# Patient Record
Sex: MALE | URBAN - METROPOLITAN AREA
[De-identification: names, ages, dates, MRNs, and addresses within clinical notes are randomized per-mention and may not be internally consistent; named-entity substitution may affect disease eponyms.]

---

## 2017-01-23 ENCOUNTER — OFFICE VISIT - HEALTHEAST (OUTPATIENT)
Dept: FAMILY MEDICINE | Facility: CLINIC | Age: 47
End: 2017-01-23

## 2017-01-23 DIAGNOSIS — M25.531 RIGHT WRIST PAIN: ICD-10-CM

## 2017-01-23 ASSESSMENT — MIFFLIN-ST. JEOR: SCORE: 1512.63

## 2017-01-24 ENCOUNTER — OFFICE VISIT (OUTPATIENT)
Dept: FAMILY MEDICINE | Facility: CLINIC | Age: 47
End: 2017-01-24

## 2017-01-24 VITALS
HEART RATE: 93 BPM | SYSTOLIC BLOOD PRESSURE: 133 MMHG | HEIGHT: 61 IN | WEIGHT: 175 LBS | TEMPERATURE: 98.1 F | DIASTOLIC BLOOD PRESSURE: 80 MMHG | BODY MASS INDEX: 33.04 KG/M2

## 2017-01-24 DIAGNOSIS — T14.8XXA BRUISE OF MUSCLE: Primary | ICD-10-CM

## 2017-01-24 RX ORDER — IBUPROFEN 600 MG/1
600 TABLET, FILM COATED ORAL EVERY 6 HOURS PRN
Qty: 90 TABLET | Refills: 1 | Status: SHIPPED | OUTPATIENT
Start: 2017-01-24

## 2017-01-24 NOTE — MR AVS SNAPSHOT
After Visit Summary   1/24/2017    Blaine Young    MRN: [id number]           Patient Information     Date Of Birth          1970        Visit Information        Provider Department      1/24/2017 4:10 PM Isabella Campbell MD [facility]        Today's Diagnoses     Bruise of muscle    -  1        Follow-ups after your visit        Additional Services     PHYSICAL THERAPY REFERRAL       PT/OT REFERRAL  Blaine Young  1970  Phone #: [phone] (home)    needed: No  Language: English    PT/OT  Facility:   Other: Patient's preference     History: likely hematoma on right quad and muscle bruise     Precautions/Contraindications: None    Imaging Studies: None    Surgical Procedure/Test Results: None    Treatment Goals:   Increase: Flexibility and Strength  Decrease: Edema, Pain and Spasm    Prognosis: excellent    Visits: Up to 10    Evaluate: Evaluate and treat    Plan: Manual Therapy    Use of Ionto/Phonophoresis Approved - patient requires a separate medication prescription. Dexamethasone 4 mg/ml injectable - 10cc                  Future tests that were ordered for you today     Open Future Orders        Priority Expected Expires Ordered    PHYSICAL THERAPY REFERRAL Routine  1/24/2018 1/24/2017            Who to contact     Please call your clinic at [phone] to:    Ask questions about your health    Make or cancel appointments    Discuss your medicines    Learn about your test results    Speak to your doctor   If you have compliments or concerns about an experience at your clinic, or if you wish to file a complaint, please contact [facility] Patient Relations at [phone] or email us at [email or web address]         Additional Information About Your Visit        MyChart Information     Forticom is an electronic gateway that provides easy, online access to your medical records. With Forticom, you can request a clinic appointment, read  "your test results, renew a prescription or communicate with your care team.     To sign up for Hy[employer] Dxhart visit the website at [email or web address]   You will be asked to enter the access code listed below, as well as some personal information. Please follow the directions to create your username and password.     Your access code is: [email or web address]  Expires: 2017  4:31 PM     Your access code will  in 90 days. If you need help or a new code, please contact your [facility] or call [phone] for assistance.        Care EveryWhere ID     This is your Care EveryWhere ID. This could be used by other organizations to access your [place] medical records  [id number]        Your Vitals Were     Pulse Temperature Height BMI (Body Mass Index)          93 98.1  F (36.7  C) (Oral) 5' 1.42\" (156 cm) 32.62 kg/m2         Blood Pressure from Last 3 Encounters:   17 133/80    Weight from Last 3 Encounters:   17 175 lb (79.379 kg)                 Today's Medication Changes          These changes are accurate as of: 17  4:31 PM.  If you have any questions, ask your nurse or doctor.               Start taking these medicines.        Dose/Directions    ibuprofen 600 MG tablet   Commonly known as:  ADVIL/MOTRIN   Used for:  Bruise of muscle   Started by:  Isabella Campbell MD        Dose:  600 mg   Take 1 tablet (600 mg) by mouth every 6 hours as needed for moderate pain   Quantity:  90 tablet   Refills:  1            Where to get your medicines      These medications were sent to [facility] [address] [place] [address] -  [facility]   [place] [facility] [address]     Phone:  [phone]    - ibuprofen 600 MG tablet             Primary Care Provider Office Phone # Fax #    Isabella Campbell [phone]       [address] [address]        Thank you!     Thank you for choosing [facility]  for your care. Our goal " is always to provide you with excellent care. Hearing back from our patients is one way we can continue to improve our services. Please take a few minutes to complete the written survey that you may receive in the mail after your visit with us. Thank you!             Your Updated Medication List - Protect others around you: Learn how to safely use, store and throw away your medicines at www.disposemymeds.org.          This list is accurate as of: 1/24/17  4:31 PM.  Always use your most recent med list.                   Brand Name Dispense Instructions for use    ibuprofen 600 MG tablet    ADVIL/MOTRIN    90 tablet    Take 1 tablet (600 mg) by mouth every 6 hours as needed for moderate pain

## 2017-01-24 NOTE — PROGRESS NOTES
Preceptor attestation:  Patient seen and discussed with the resident.  Assessment and plan reviewed with resident and agreed upon.  Supervising physician: Luther Massey  [facility]

## 2017-01-24 NOTE — PROGRESS NOTES
"       SUBJECTIVE       Blaine Young is a 46 year old  male with a PMH significant for:   There is no problem list on file for this patient.    Per nursing;  Pt states he was hurt at work.  He states he was working on a car and tried to do something with a bar and when he pulled it hit him in the R thigh really hard.  He states this happened last week and he had a lump on his thigh and it was hard to walk on his leg.     He notes that the area hurts a lot when he engages the muscles in his thigh.  He does not have any swelling. He is having severe pain when he touches the area, pain locally over the bruise.  He also is having pain in his hip when he engages the hip.  He has tried ibuprofen 600mg twice daily, heat helping.  He can walk okay, but feels pain when engaging the muscles.  Pain is over his right thigh to his hip proximally.      PMH, Medications and Allergies were reviewed and updated as needed.        REVIEW OF SYSTEMS     Pertinent review, per HPI.        OBJECTIVE     Filed Vitals:    01/24/17 1615   BP: 133/80   Pulse: 93   Temp: 98.1  F (36.7  C)   TempSrc: Oral   Height: 5' 1.42\" (156 cm)   Weight: 175 lb (79.379 kg)     Body mass index is 32.62 kg/(m^2).    Constitutional: Well appearing, no acute distress.  Extremities: No swelling, erythema over right thigh.  Tenderness locally over anterior thigh to palpation.  Able to move extremity spontaneously, strength testing hard to tell based on pain of thigh.  Skin graft present over right thigh where pain is, but no ecchymosis or erythema noted.     No results found for this or any previous visit (from the past 24 hour(s)).        ASSESSMENT AND PLAN     Muscle injury; possible hematoma.    Comment: Patient with injury to his right thigh at work, when he had a kin come down on it very hard.  Now having pain over the area. Happened one week ago.  Ibuprofen and heat help - continue these and increase iburprofen to 600mg TID. Will send to PT for manual " therapy.  No concern for bony injury and will have patient follow up in clinic in 2 weeks for another exam or sooner if worsening symptoms   Plan: PHYSICAL THERAPY REFERRAL, ibuprofen         (ADVIL/MOTRIN) 600 MG tablet    RTC 2 wk or sooner if develops new or worsening symptoms.      Isabella Campbell MD  PGY-3 [facility]

## 2017-01-26 NOTE — PATIENT INSTRUCTIONS
[facility]  Physical Therapy  [phone]  Referral has been faxed they will contact pt to schedule Michelle Benedict 9:15 AM 1/26/2017

## 2017-01-27 ENCOUNTER — AMBULATORY - HEALTHEAST (OUTPATIENT)
Dept: ADMINISTRATIVE | Facility: REHABILITATION | Age: 47
End: 2017-01-27

## 2017-01-27 DIAGNOSIS — T14.8XXA BRUISE OF MUSCLE: ICD-10-CM

## 2017-03-03 ENCOUNTER — THERAPY VISIT (OUTPATIENT)
Dept: PHYSICAL THERAPY | Facility: CLINIC | Age: 47
End: 2017-03-03
Payer: OTHER MISCELLANEOUS

## 2017-03-03 DIAGNOSIS — M54.6 BILATERAL THORACIC BACK PAIN: ICD-10-CM

## 2017-03-03 DIAGNOSIS — M54.50 BILATERAL LOW BACK PAIN WITHOUT SCIATICA: Primary | ICD-10-CM

## 2017-03-03 PROCEDURE — 97140 MANUAL THERAPY 1/> REGIONS: CPT | Mod: GP | Performed by: PHYSICAL THERAPIST

## 2017-03-03 PROCEDURE — 97014 ELECTRIC STIMULATION THERAPY: CPT | Mod: GP | Performed by: PHYSICAL THERAPIST

## 2017-03-03 PROCEDURE — 97161 PT EVAL LOW COMPLEX 20 MIN: CPT | Mod: GP | Performed by: PHYSICAL THERAPIST

## 2017-03-03 NOTE — PROGRESS NOTES
Physical Therapy Initial Evaluation   3/6/17   Precautions/Restrictions/MD instructions:    Therapist Impression:   Pt is a 47 y/o male. Pt presents with LBP and thoracic pain, decrease ROM and strength deficits. These impairments limit their ability to lift objects and perform work duties Skilled PT services necessary in order to reduce impairments and improve independent function.    Subjective:   Chief Complaint: Lumbar pain after carrying heavy objects at work   DOI/onset: 2/21/17DOS:   Location: Low back  Quality: sharp   Frequency: constant  Radiates: radiates thoracic pain and down L buttock  Pain scale: 5/10  Time of day: no 24 hr pain pattern Sleeping: wakes patient from sleep    Exacerbated by: lifting objects  Relieved by: rest Progression: improving   Previous Treatment: n/a Effect of prior treatment: n/a   PMH and/or surgical history:    Imaging:     Occupation:  Job duties:    Current HEP/exercise regimen:  Patient's goals:   Medications:   General health as reported by patient:   Return to MD:   Pt reports no red flags   Lumbar Spine/SIJ Evaluation:    Gait: Slight bent posture while ambulating, decrease alma       Lumbar AROM:   Motion ROM Pain   Flexion 15% loss +   Extension WFL -   Side Bend L WFL +   Side Bend R WFL -     Repeated Motion: No pain with repeated standing extension    Lumbar Myotomes   L R   T12-L3 (Hip Flexion) 4/5 5/5   L2-L4 (Knee Extension) 4/5 5/5   L4 (Ankle DF) 5/5 5/5   L5 (Great Toe Ext) 5/5 5/5   S1 (Ankle PF) 5/5 5/5     Lumbar DTR's:    L R   L4 (Quad) NT NT   S1 (Achilles) NT NT     Lumbar Dermatomes: NT as patient reports no changes in        Palpation: TTP along L3-L5 with PAs, TTP along B low back extensors, TTP along thoracic extensors bilaterally from T-10 to T5    Subjective:    HPI                    Objective:    System    Physical Exam    General     ROS    Assessment/Plan:      Patient is a 46 year old male with thoracic and lumbar complaints.    Patient  has the following significant findings with corresponding treatment plan.                Diagnosis 1:  LBP and thoracic pain  Pain -  hot/cold therapy, US, electric stimulation, manual therapy, splint/taping/bracing/orthotics, self management, education, directional preference exercise and home program  Decreased ROM/flexibility - manual therapy and therapeutic exercise  Decreased joint mobility - manual therapy and therapeutic exercise  Decreased strength - therapeutic exercise and therapeutic activities  Impaired gait - gait training  Impaired muscle performance - neuro re-education  Decreased function - therapeutic activities    Therapy Evaluation Codes:   1) History comprised of:   Personal factors that impact the plan of care:      None.    Comorbidity factors that impact the plan of care are:      Diabetes.     Medications impacting care: Muscle Relaxants.  2) Examination of Body Systems comprised of:   Body structures and functions that impact the plan of care:      Lumbar spine and Thoracic Spine.   Activity limitations that impact the plan of care are:      Lifting, Walking and Working.  3) Clinical presentation characteristics are:   Stable/Uncomplicated.  4) Decision-Making    Low complexity using standardized patient assessment instrument and/or measureable assessment of functional outcome.  Cumulative Therapy Evaluation is: Low complexity.    Previous and current functional limitations:  (See Goal Flow Sheet for this information)    Short term and Long term goals: (See Goal Flow Sheet for this information)     Communication ability:  Patient appears to be able to clearly communicate and understand verbal and written communication and follow directions correctly.  Treatment Explanation - The following has been discussed with the patient:   RX ordered/plan of care  Anticipated outcomes  Possible risks and side effects  This patient would benefit from PT intervention to resume normal activities.   Rehab  potential is good.    Frequency:  1 X week, once daily  Duration:  for 6 weeks  Discharge Plan:  Achieve all LTG.  Independent in home treatment program.  Reach maximal therapeutic benefit.    Please refer to the daily flowsheet for treatment today, total treatment time and time spent performing 1:1 timed codes.

## 2017-03-03 NOTE — LETTER
[facility] PHYSICAL THERAPY  [address] [place] [address]  [phone]    2017    Re: Blaine Young   :   1970  MRN:  [id number]   REFERRING PHYSICIAN:   Narayan Hill    [facility] PHYSICAL [facility]    Date of Initial Evaluation:  3/3/2017  Visits:  Rxs Used: 1  Reason for Referral:     Bilateral low back pain without sciatica  Bilateral thoracic back pain    EVALUATION SUMMARY    Physical Therapy Initial Evaluation     Therapist Impression:   Pt is a 45 y/o male. Pt presents with LBP and thoracic pain, decrease ROM and strength deficits. These impairments limit their ability to lift objects and perform work duties Skilled PT services necessary in order to reduce impairments and improve independent function.     Subjective:   Chief Complaint: Lumbar pain after carrying heavy objects at work   DOI/onset: 17DOS:   Location: Low back  Quality: sharp   Frequency: constant  Radiates: radiates thoracic pain and down L buttock  Pain scale: 5/10  Time of day: no 24 hr pain pattern Sleeping: wakes patient from sleep    Exacerbated by: lifting objects  Relieved by: rest Progression: improving   Previous Treatment: n/a Effect of prior treatment: n/a   PMH and/or surgical history:    Imaging:     Occupation:  Job duties:    Current HEP/exercise regimen:  Patient's goals:   Medications:   General health as reported by patient:   Return to MD:   Re: Blaine Young   :   1970    Pt reports no red flags   Pertinent medical history includes:  Diabetes.      Current medications:  Muscle relaxants.  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    Primary job tasks include:  Prolonged sitting.    Lumbar Spine/SIJ Evaluation:  Gait: Slight bent posture while ambulating, decrease alma   Lumbar AROM:   Motion ROM Pain   Flexion 15% loss +   Extension WFL -   Side Bend L WFL +   Side Bend R WFL -     Repeated  Motion: No pain with repeated standing extension  Lumbar Myotomes   L R   T12-L3 (Hip Flexion) 4/5 5/5   L2-L4 (Knee Extension) 4/5 5/5   L4 (Ankle DF) 5/5 5/5   L5 (Great Toe Ext) 5/5 5/5   S1 (Ankle PF) 5/5 5/5     Lumbar DTR's:    L R   L4 (Quad) NT NT   S1 (Achilles) NT NT     Lumbar Dermatomes: NT as patient reports no changes in   Palpation: TTP along L3-L5 with PAs, TTP along B low back extensors, TTP along thoracic extensors bilaterally from T-10 to T5    Assessment/Plan:    Patient is a 46 year old male with thoracic and lumbar complaints.    Patient has the following significant findings with corresponding treatment plan.                Diagnosis 1:  LBP and thoracic pain  Pain -  hot/cold therapy, US, electric stimulation, manual therapy, splint/taping/bracing/orthotics, self management, education, directional preference exercise and home program  Decreased ROM/flexibility - manual therapy and therapeutic exercise  Decreased joint mobility - manual therapy and therapeutic exercise  Decreased strength - therapeutic exercise and therapeutic activities  Impaired gait - gait training  Impaired muscle performance - neuro re-education  Decreased function - therapeutic activities            Re: Blaine Cosby Hector   :   1970    Therapy Evaluation Codes:   1) History comprised of:   Personal factors that impact the plan of care:      None.    Comorbidity factors that impact the plan of care are:      Diabetes.     Medications impacting care: Muscle Relaxants.  2) Examination of Body Systems comprised of:   Body structures and functions that impact the plan of care:      Lumbar spine and Thoracic Spine.   Activity limitations that impact the plan of care are:      Lifting, Walking and Working.  3) Clinical presentation characteristics are:   Stable/Uncomplicated.  4) Decision-Making    Low complexity using standardized patient assessment instrument and/or measureable assessment of functional  outcome.  Cumulative Therapy Evaluation is: Low complexity.    Previous and current functional limitations:  (See Goal Flow Sheet for this information)    Short term and Long term goals: (See Goal Flow Sheet for this information)     Communication ability:  Patient appears to be able to clearly communicate and understand verbal and written communication and follow directions correctly.  Treatment Explanation - The following has been discussed with the patient:   RX ordered/plan of care  Anticipated outcomes  Possible risks and side effects  This patient would benefit from PT intervention to resume normal activities.   Rehab potential is good.    Frequency:  1 X week, once daily  Duration:  for 6 weeks  Discharge Plan:  Achieve all LTG.  Independent in home treatment program.  Reach maximal therapeutic benefit.      Thank you for your referral.    INQUIRIES  Therapist: Juan Aguirre PT  [facility]  [address] [place] [id number]  Phone: [phone]  Fax: [phone]

## 2017-03-03 NOTE — MR AVS SNAPSHOT
After Visit Summary   3/3/2017    Blaine Young    MRN: [id number]           Patient Information     Date Of Birth          1970        Visit Information        Provider Department      3/3/2017 9:30 AM Juan Aguirre PT [facility] Physical [facility]        Today's Diagnoses     Bilateral low back pain without sciatica    -  1    Bilateral thoracic back pain           Follow-ups after your visit        Your next 10 appointments already scheduled     Mar 10, 2017 10:10 AM CST   LISA Spine with Juan Aguirre PT   [facility] Physical Therapy ([facility]  )    [address] [place] [id number] [phone]            Mar 17, 2017 10:10 AM CDT   LISA Spine with Juan Aguirre PT   [facility] Physical Therapy ([address] [address] [phone]              Who to contact     If you have questions or need follow up information about today's clinic visit or your schedule please contact [facility] PHYSICAL THERAPY directly at [phone].  Normal or non-critical lab and imaging results will be communicated to you by MyChart, letter or phone within 4 business days after the clinic has received the results. If you do not hear from us within 7 days, please contact the clinic through [email or web address] or phone. If you have a critical or abnormal lab result, we will notify you by phone as soon as possible.  Submit refill requests through [employer] or call your pharmacy and they will forward the refill request to us. Please allow 3 business days for your refill to be completed.          Additional Information About Your Visit        MyChart Information     [employer] lets you send messages to your doctor, view your test results, renew your prescriptions, schedule appointments and more. To sign up, go to  [email or web address] . Click on \"Log in\" on the left side of the screen, which will take you to the Welcome page. Then click on \"Sign up Now\" on the right side of the page.     You will be asked to enter the access code listed below, as well as some personal information. Please follow the directions to create your username and password.     Your access code is: [email or web address]  Expires: 2017 10:24 AM     Your access code will  in 90 days. If you need help or a new code, please call your [place] clinic or [phone].        Care EveryWhere ID     This is your Care EveryWhere ID. This could be used by other organizations to access your [place] medical records  [id number]         Blood Pressure from Last 3 Encounters:   No data found for BP    Weight from Last 3 Encounters:   No data found for Wt              We Performed the Following     ELECTRIC STIMULATION THERAPY     HC PT EVAL, LOW COMPLEXITY     LISA INITIAL EVAL REPORT     MANUAL THER TECH,1+REGIONS,EA 15 MIN        Primary Care Provider    None Specified       No primary provider on file.        Thank you!     Thank you for choosing [place] [facility] PHYSICAL THERAPY  for your care. Our goal is always to provide you with excellent care. Hearing back from our patients is one way we can continue to improve our services. Please take a few minutes to complete the written survey that you may receive in the mail after your visit with us. Thank you!             Your Updated Medication List - Protect others around you: Learn how to safely use, store and throw away your medicines at www.disposemymeds.org.      Notice  As of 3/3/2017 11:59 PM    You have not been prescribed any medications.      "

## 2017-03-06 PROBLEM — M54.50 BILATERAL LOW BACK PAIN WITHOUT SCIATICA: Status: ACTIVE | Noted: 2017-03-06

## 2017-03-06 PROBLEM — M54.6 BILATERAL THORACIC BACK PAIN: Status: ACTIVE | Noted: 2017-03-06

## 2017-03-08 NOTE — PROGRESS NOTES
Subjective:                                       Pertinent medical history includes:  Diabetes.      Current medications:  Muscle relaxants.  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    Primary job tasks include:  Prolonged sitting.                              Objective:    System    Physical Exam    General     ROS    Assessment/Plan:

## 2017-03-10 ENCOUNTER — THERAPY VISIT (OUTPATIENT)
Dept: PHYSICAL THERAPY | Facility: CLINIC | Age: 47
End: 2017-03-10
Payer: OTHER MISCELLANEOUS

## 2017-03-10 DIAGNOSIS — M54.50 BILATERAL LOW BACK PAIN WITHOUT SCIATICA: ICD-10-CM

## 2017-03-10 DIAGNOSIS — M54.6 BILATERAL THORACIC BACK PAIN: ICD-10-CM

## 2017-03-10 PROCEDURE — 97035 APP MDLTY 1+ULTRASOUND EA 15: CPT | Mod: GP | Performed by: PHYSICAL THERAPIST

## 2017-03-10 PROCEDURE — 97110 THERAPEUTIC EXERCISES: CPT | Mod: GP | Performed by: PHYSICAL THERAPIST

## 2017-03-10 PROCEDURE — 97140 MANUAL THERAPY 1/> REGIONS: CPT | Mod: GP | Performed by: PHYSICAL THERAPIST

## 2017-03-22 ENCOUNTER — THERAPY VISIT (OUTPATIENT)
Dept: PHYSICAL THERAPY | Facility: CLINIC | Age: 47
End: 2017-03-22
Payer: OTHER MISCELLANEOUS

## 2017-03-22 DIAGNOSIS — M54.50 BILATERAL LOW BACK PAIN WITHOUT SCIATICA: ICD-10-CM

## 2017-03-22 DIAGNOSIS — M54.6 BILATERAL THORACIC BACK PAIN: ICD-10-CM

## 2017-03-22 PROCEDURE — 97035 APP MDLTY 1+ULTRASOUND EA 15: CPT | Mod: GP | Performed by: PHYSICAL THERAPIST

## 2017-03-22 PROCEDURE — 97110 THERAPEUTIC EXERCISES: CPT | Mod: GP | Performed by: PHYSICAL THERAPIST

## 2017-03-29 ENCOUNTER — THERAPY VISIT (OUTPATIENT)
Dept: PHYSICAL THERAPY | Facility: CLINIC | Age: 47
End: 2017-03-29
Payer: OTHER MISCELLANEOUS

## 2017-03-29 DIAGNOSIS — M54.6 BILATERAL THORACIC BACK PAIN: ICD-10-CM

## 2017-03-29 DIAGNOSIS — M54.50 BILATERAL LOW BACK PAIN WITHOUT SCIATICA: ICD-10-CM

## 2017-03-29 PROCEDURE — 97140 MANUAL THERAPY 1/> REGIONS: CPT | Mod: GP | Performed by: PHYSICAL THERAPIST

## 2017-03-29 PROCEDURE — 97035 APP MDLTY 1+ULTRASOUND EA 15: CPT | Mod: GP | Performed by: PHYSICAL THERAPIST

## 2017-03-29 PROCEDURE — 97110 THERAPEUTIC EXERCISES: CPT | Mod: GP | Performed by: PHYSICAL THERAPIST

## 2017-04-05 ENCOUNTER — THERAPY VISIT (OUTPATIENT)
Dept: PHYSICAL THERAPY | Facility: CLINIC | Age: 47
End: 2017-04-05
Payer: OTHER MISCELLANEOUS

## 2017-04-05 DIAGNOSIS — M54.50 BILATERAL LOW BACK PAIN WITHOUT SCIATICA: ICD-10-CM

## 2017-04-05 DIAGNOSIS — M54.6 BILATERAL THORACIC BACK PAIN: ICD-10-CM

## 2017-04-05 PROCEDURE — 97110 THERAPEUTIC EXERCISES: CPT | Mod: GP | Performed by: PHYSICAL THERAPIST

## 2017-04-05 PROCEDURE — 2894A C MANUAL THER TECH,1+REGIONS,EA 15 MIN: CPT | Mod: GP | Performed by: PHYSICAL THERAPIST

## 2017-04-05 NOTE — MR AVS SNAPSHOT
"              After Visit Summary   4/5/2017    Blaine Young    MRN: [id number]           Patient Information     Date Of Birth          1970        Visit Information        Provider Department      4/5/2017 8:10 AM Juan Aguirre PT [facility]        Today's Diagnoses     Bilateral low back pain without sciatica        Bilateral thoracic back pain           Follow-ups after your visit        Your next 10 appointments already scheduled     Apr 12, 2017  8:10 AM CDT   [facility] with Juan Aguirre PT   [facility] ([facility]  )    [address] [place] [id number] [phone]              Who to contact     If you have questions or need follow up information about today's clinic visit or your schedule please contact [facility] directly at [phone].  Normal or non-critical lab and imaging results will be communicated to you by InnoPadhart, letter or phone within 4 business days after the clinic has received the results. If you do not hear from us within 7 days, please contact the clinic through InnoPadhart or phone. If you have a critical or abnormal lab result, we will notify you by phone as soon as possible.  Submit refill requests through Builk or call your pharmacy and they will forward the refill request to us. Please allow 3 business days for your refill to be completed.          Additional Information About Your Visit        MyChart Information     Builk lets you send messages to your doctor, view your test results, renew your prescriptions, schedule appointments and more. To sign up, go to [email or web address] . Click on \"Log in\" on the left side of the screen, which will take you to the Welcome page. Then click on \"Sign up Now\" on the right side of the page.     You will be asked to enter the access code listed below, as " well as some personal information. Please follow the directions to create your username and password.     Your access code is: [email or web address]  Expires: 2017 11:24 AM     Your access code will  in 90 days. If you need help or a new code, please call your [facility] clinic or [phone].        Care EveryWhere ID     This is your Care EveryWhere ID. This could be used by other organizations to access your [facility] medical records  [id number]         Blood Pressure from Last 3 Encounters:   No data found for BP    Weight from Last 3 Encounters:   No data found for Wt              We Performed the Following     MANUAL THER TECH,1+REGIONS,EA 15 MIN     THERAPEUTIC EXERCISES        Primary Care Provider    None Specified       No primary provider on file.        Thank you!     Thank you for choosing [facility]  for your care. Our goal is always to provide you with excellent care. Hearing back from our patients is one way we can continue to improve our services. Please take a few minutes to complete the written survey that you may receive in the mail after your visit with us. Thank you!             Your Updated Medication List - Protect others around you: Learn how to safely use, store and throw away your medicines at www.disposemymeds.org.      Notice  As of 2017 11:59 PM    You have not been prescribed any medications.

## 2017-04-10 NOTE — PROGRESS NOTES
The risks, perceived benefits and potential complications (including but not limited to: dizziness/drowsiness, bleeding, infection, pain, damage to adjacent structures, failure to relieve symptoms) of dry needling were discussed with the patient. Questions were addressed and answered.  The patient elected to proceed. Verbal informed consent was obtained.    Subjective:    HPI                    Objective:    System    Physical Exam    General     ROS    Assessment/Plan:

## 2021-05-26 ENCOUNTER — RECORDS - HEALTHEAST (OUTPATIENT)
Dept: ADMINISTRATIVE | Facility: CLINIC | Age: 51
End: 2021-05-26

## 2021-05-30 ENCOUNTER — RECORDS - HEALTHEAST (OUTPATIENT)
Dept: ADMINISTRATIVE | Facility: CLINIC | Age: 51
End: 2021-05-30

## 2021-07-20 VITALS — HEIGHT: 62 IN | BODY MASS INDEX: 31.7 KG/M2 | WEIGHT: 172.25 LBS

## 2021-07-20 NOTE — PROGRESS NOTES
"Assessment / Impression     1. Right wrist pain  Ambulatory referral to Orthopedic Surgery         Plan:     He was given her a referral to see an orthopedic hand specialist for further evaluation and treatment.  He may continue occupational therapy as it has provided some relief.  I personally reviewed the previous 2 office visit notes he had with Dr. Nguyen as well as the negative x-ray results from 9/7/17.    Subjective:      HPI: Blaine Young is a 46 y.o. male who presents to the clinic to follow-up regarding a right sided wrist injury that occurred at work last fall.  He reports having a twisting wrist injury in early September when he was at work using an impact gun.  He was seen in our clinic by another provider and had a negative x-ray on 9/7.  He was referred to occupational therapy.  This helped improve his symptoms, but he still struggles with pain especially with overuse.  He is wondering if there are other treatments that may be more beneficial.      XR WRIST RIGHT NAVICULAR 3 OR MORE VWS9/7/2016 12:05 PMINDICATION: Right wrist pain.COMPARISON: None.FINDINGS: Negative wrist. No fracture or dislocation.This report was electronically interpreted by: Dr. Eduard Pereira MD ON 09/07/2016 at 12:35        Review of Systems  All other systems reviewed and are negative.     History   Smoking Status     Never Smoker   Smokeless Tobacco     Not on file       No family history on file.    Objective:     Visit Vitals     /76 (Patient Site: Left Arm, Patient Position: Sitting, Cuff Size: Adult Regular)     Pulse 88     Temp 98.5  F (36.9  C) (Oral)     Resp 16     Ht 5' 1.5\" (1.562 m)     Wt 172 lb 4 oz (78.1 kg)     BMI 32.02 kg/m2     Physical Examination: General appearance - alert, well appearing, and in no distress  Neurological: alert, oriented, normal speech, no focal findings or movement disorder noted.    Extremities: No edema, no clubbing or cyanosis.  Mild tenderness over the dorsum of his " right wrist.  No swelling.  Wrist flexion and extension is normal, but flexion produces discomfort.  Tinel's and testing negative.  Radial ulnar pulses 2 out of 4 bilaterally.  There is a scar over the dorsum of his right hand.    No results found for this or any previous visit (from the past 168 hour(s)).    Current Outpatient Prescriptions   Medication Sig     metFORMIN (GLUCOPHAGE) 500 MG tablet Take 500 mg by mouth 2 (two) times a day with meals.     naproxen (NAPROSYN) 500 MG tablet Take 1 tablet (500 mg total) by mouth 2 (two) times a day with meals.